# Patient Record
Sex: MALE | Race: WHITE
[De-identification: names, ages, dates, MRNs, and addresses within clinical notes are randomized per-mention and may not be internally consistent; named-entity substitution may affect disease eponyms.]

---

## 2020-07-05 ENCOUNTER — HOSPITAL ENCOUNTER (EMERGENCY)
Dept: HOSPITAL 62 - ER | Age: 40
LOS: 1 days | Discharge: HOME | End: 2020-07-06
Payer: SELF-PAY

## 2020-07-05 DIAGNOSIS — R09.81: ICD-10-CM

## 2020-07-05 DIAGNOSIS — R51: ICD-10-CM

## 2020-07-05 DIAGNOSIS — J34.89: ICD-10-CM

## 2020-07-05 DIAGNOSIS — J33.9: Primary | ICD-10-CM

## 2020-07-05 PROCEDURE — 99283 EMERGENCY DEPT VISIT LOW MDM: CPT

## 2020-07-06 VITALS — DIASTOLIC BLOOD PRESSURE: 91 MMHG | SYSTOLIC BLOOD PRESSURE: 159 MMHG

## 2020-07-06 NOTE — ER DOCUMENT REPORT
HPI





- HPI


Time Seen by Provider: 07/06/20 01:19


Pain Level: Denies


Context: 


Patient is a 40-year-old male that comes emergency department for chief 

complaint of symptoms worsening over the past couple of weeks.  He states that 

he is having increasing pressure, nasal congestion, and even intermittent 

headaches and a swelling sensation in the left nasal passage.  He states it is 

starting to affect his sleep and his symptoms are gradually worsening.  He 

denies shortness of breath, current headache, fever, bloody or purulent 

drainage, sore throat, or any other complaints.  He denies any daily 

medications, he denies smoking, he denies history of asthma.  He denies any 

other complaints.








- CONSTITUTIONAL


Constitutional: DENIES: Fever, Chills





- EENT


EENT: DENIES: Sore Throat, Ear Pain, Eye problems





- NEURO


Neurology: DENIES: Headache, Weakness, Vision blurred, Dizzinesss / Vertigo





- CARDIOVASCULAR


Cardiovascular: DENIES: Chest pain





- RESPIRATORY


Respiratory: DENIES: Trouble Breathing, Coughing





- GASTROINTESTINAL


Gastrointestinal: DENIES: Abdominal Pain, Black / Bloody Stools





- URINARY


Urinary: DENIES: Dysuria, Urgency, Frequency





- REPRODUCTIVE


Reproductive: DENIES: Pregnant:





- MUSCULOSKELETAL


Musculoskeletal: DENIES: Extremity pain





Past Medical History





- General


Information source: Patient





- Social History


Smoking Status: Never Smoker


Frequency of alcohol use: Occasional


Drug Abuse: None


Lives with: Family


Family History: Reviewed & Not Pertinent


Patient has homicidal ideation: No





- Medical History


Medical History: Negative


Surgical Hx: Negative





Vertical Provider Document





- CONSTITUTIONAL


General Appearance: WD/WN, No Apparent Distress





- HEENT


HEENT: Atraumatic, Normocephalic.  negative: Normal ENT Exam - The right nare 

and the septum are unremarkable.  The left nare shows what appears to be a 

moderate sized polyp in the mid nasal passage, patient does have some mild 

rhinorrhea on the same side as well.  There is no epistaxis or purulent 

drainage, no significant tenderness, no swelling to the face.  Oropharyngeal 

exam unremarkable, ears and eye exams are unremarkable.





- NECK


Neck: Normal Inspection





- RESPIRATORY


Respiratory: Breath Sounds Normal, No Respiratory Distress





- CARDIOVASCULAR


Cardiovascular: Regular Rate, Regular Rhythm





- GI/ABDOMEN


Gastrointestinal: Abdomen Soft, Abdomen Non-Tender.  negative: Abdomen Tender





- BACK


Back: Normal Inspection





- MUSCULOSKELETAL/EXTREMETIES


Musculoskeletal/Extremeties: MAEW, FROM, Non-Tender





- NEURO


Level of Consciousness: Awake, Alert, Appropriate


Motor/Sensory: No Motor Deficit, No Sensory Deficit





- DERM


Integumentary: Warm, Dry, No Rash





Course





- Re-evaluation


Re-evalutation: 


Patient has what appears to be a nasal polyp without.  No other concerning 

findings.  Discussed options.  Referring to ENT, providing steroids, discussed 

return precautions.  Patient states understanding and agreement with plan.





- Vital Signs


Vital signs: 


                                        











Temp Pulse Resp BP Pulse Ox


 


 99.6 F   61   16   177/110 H  100 


 


 07/06/20 00:03  07/06/20 00:03  07/06/20 00:03  07/06/20 00:03  07/06/20 00:03














Discharge





- Discharge


Clinical Impression: 


 Nose symptom, Nasal polyp





Condition: Stable


Disposition: HOME, SELF-CARE


Additional Instructions: 


Your evaluation is consistent with a large nasal polyp in the left nasal 

passage.


Because of the size and your symptoms we are starting you on steroids and I also

highly recommend that you follow-up with the ENT referral for additional 

management of this.  See the referral, call tomorrow to set up your appointment.





Return for any other concerning or worsening symptoms including severe worsening

pain, developing discolored discharge, fever, or any other concerning or 

worsening symptoms.


Prescriptions: 


Prednisone [Deltasone 10 mg Tablet] 10 mg PO ASDIR PRN #21 tablet


 PRN Reason: 


Referrals: 


JIMBO PINEDO DO [ASSOCIATE] - Follow up as needed